# Patient Record
Sex: FEMALE | Race: WHITE | NOT HISPANIC OR LATINO | URBAN - METROPOLITAN AREA
[De-identification: names, ages, dates, MRNs, and addresses within clinical notes are randomized per-mention and may not be internally consistent; named-entity substitution may affect disease eponyms.]

---
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
## 2025-05-19 ENCOUNTER — OFFICE VISIT (OUTPATIENT)
Dept: DERMATOLOGY | Facility: IMAGING CENTER | Age: 20
End: 2025-05-19
Payer: COMMERCIAL

## 2025-05-19 DIAGNOSIS — L70.0 ACNE VULGARIS: ICD-10-CM

## 2025-05-19 LAB
POCT INT CON NEG: NEGATIVE
POCT INT CON POS: POSITIVE
POCT URINE PREGNANCY TEST: NEGATIVE

## 2025-05-19 PROCEDURE — 99204 OFFICE O/P NEW MOD 45 MIN: CPT | Performed by: STUDENT IN AN ORGANIZED HEALTH CARE EDUCATION/TRAINING PROGRAM

## 2025-05-19 PROCEDURE — 81025 URINE PREGNANCY TEST: CPT | Performed by: STUDENT IN AN ORGANIZED HEALTH CARE EDUCATION/TRAINING PROGRAM

## 2025-05-19 RX ORDER — LEVOTHYROXINE SODIUM 25 UG/1
25 TABLET ORAL
COMMUNITY

## 2025-05-19 NOTE — PROGRESS NOTES
[facility] DERMATOLOGY CLINIC NOTE    Chief Complaint   Patient presents with    [facility]    Acne        HPI:    Mary June is a 19 y.o. female here for evaluation of Acne. Interested in Accutane. Sister has been on Accutane. pt will be attending College in September in [employer] [facility]      Acne  Started: since was 14 years old. Has gotten worse in the last couple years  Active on: Face  Aggravated by: No, believes its hormonal   Treatment currently used: Tretinoin 0.025%  Prior treatments used: No  Face wash/moisturizer: Cerave Hydrating foaming cleanser, Cerave hyaluronic acid. Neutrogena sunscreen  Family history of scarring acne: Father and paternal aunts  Contraception: No. Was on it but made acne worse  Family h/o breast/uterine/ovarian cancers: No     No other symptomatic (itching, painful, burning) or changing lesions.       Review of Systems: No fevers, chill. Pertinent positives and negatives above.       Medications, Medical History, Surgical History, Family History & Allergies:  Reviewed in the chart, relevant history noted above.       PHYSICAL EXAM  Focused skin exam of face and neck    - scattered erythematous papules, cysts with depressed hyperpigmented scarring on the cheeks, forehead, chin      ASSESSMENT & PLAN        ASSESSMENT & PLAN  Acne vulgaris - severe, inflammatory, scaring    - educated about diagnosis, management options, and expectations of treatment  - given lack of response/clearance with prior treatment, I discussed options of continuing current regimen, versus initiation of isotretinoin to prevent further activity and scarring. After discussion of risks and benefits as below, patient opts to start isotretinoin.   - Introductory packet for isotretinoin treatment provided and reviewed.     ISOTRETINOIN COUNSELING:   I extensively discussed the potential adverse effects of treatment, including, but not limited to, birth defects, eye disturbances, bony abnormailities, lipid disturbances,  liver inflammation, reported associations with inflammatory bowel disease, depression, endocrine disturbances, hematologic abnormalities, and muscle inflammation. More common side effects of dry skin/eyes/mucosa, photosensitivity, sticky skin, hair loss, fragile nails, paronychia, muscle aches, fatigue, headache, and abdominal pain/nausea/diarrhea were also reviewed.    I additionally discussed the need for two forms of birth control, (or abstinence), and the need to confirm/initiate these two forms of birth control for at least one month prior to starting treatment. We discussed the need to comply with pregnancy testing before, during, and after treatment (monthy, continued until one month after completion of course), which coincides with her necessary monthly visits to the office throughout the duration of treatment.     Counseled patient to not share the medication, and not to donate blood during treatment, and through one month after completion of treatment.    - we discussed the need for baseline blood work, and follow-up blood work once treatment is initiated   - Baseline fasting lipid, hepatic panel ordered  - recommended to take medication with food with healthy fat, as well to avoid alcohol consumption  - patient aware to notify the office if any new/concerning symptoms  - lubricating eye drops, nasal spray, vaseline  - birth control method: Abstinence  - Pregnancy test negative today  - ipledge ID: [id number]    Return in about 30 days (around 6/18/2025) for to start accutane.        Nasreen Dorsey MD  [facility]

## 2025-05-21 ENCOUNTER — HOSPITAL ENCOUNTER (OUTPATIENT)
Dept: LAB | Facility: MEDICAL CENTER | Age: 20
End: 2025-05-21
Attending: STUDENT IN AN ORGANIZED HEALTH CARE EDUCATION/TRAINING PROGRAM
Payer: COMMERCIAL

## 2025-05-21 DIAGNOSIS — L70.0 ACNE VULGARIS: ICD-10-CM

## 2025-05-21 PROCEDURE — 80076 HEPATIC FUNCTION PANEL: CPT

## 2025-05-21 PROCEDURE — 80061 LIPID PANEL: CPT

## 2025-05-21 PROCEDURE — 36415 COLL VENOUS BLD VENIPUNCTURE: CPT

## 2025-05-22 LAB
ALBUMIN SERPL BCP-MCNC: 4.6 G/DL (ref 3.2–4.9)
ALP SERPL-CCNC: 100 U/L (ref 30–99)
ALT SERPL-CCNC: 11 U/L (ref 2–50)
AST SERPL-CCNC: 18 U/L (ref 12–45)
BILIRUB CONJ SERPL-MCNC: <0.2 MG/DL (ref 0.1–0.5)
BILIRUB INDIRECT SERPL-MCNC: ABNORMAL MG/DL (ref 0–1)
BILIRUB SERPL-MCNC: 0.3 MG/DL (ref 0.1–1.5)
CHOLEST SERPL-MCNC: 180 MG/DL (ref 100–199)
FASTING STATUS PATIENT QL REPORTED: NORMAL
HDLC SERPL-MCNC: 54 MG/DL
LDLC SERPL CALC-MCNC: 115 MG/DL
PROT SERPL-MCNC: 8.1 G/DL (ref 6–8.2)
TRIGL SERPL-MCNC: 56 MG/DL (ref 0–149)

## 2025-05-23 ENCOUNTER — RESULTS FOLLOW-UP (OUTPATIENT)
Dept: DERMATOLOGY | Facility: IMAGING CENTER | Age: 20
End: 2025-05-23

## 2025-06-24 ENCOUNTER — OFFICE VISIT (OUTPATIENT)
Dept: DERMATOLOGY | Facility: IMAGING CENTER | Age: 20
End: 2025-06-24
Payer: COMMERCIAL

## 2025-06-24 VITALS — BODY MASS INDEX: 28.06 KG/M2 | WEIGHT: 190 LBS

## 2025-06-24 DIAGNOSIS — Z86.19 H/O COLD SORES: ICD-10-CM

## 2025-06-24 DIAGNOSIS — Z79.899 ENCOUNTER FOR LONG-TERM CURRENT USE OF HIGH RISK MEDICATION: ICD-10-CM

## 2025-06-24 DIAGNOSIS — L70.0 ACNE VULGARIS: ICD-10-CM

## 2025-06-24 LAB
POCT INT CON NEG: NEGATIVE
POCT INT CON POS: POSITIVE
POCT URINE PREGNANCY TEST: NEGATIVE

## 2025-06-24 PROCEDURE — 81025 URINE PREGNANCY TEST: CPT | Performed by: STUDENT IN AN ORGANIZED HEALTH CARE EDUCATION/TRAINING PROGRAM

## 2025-06-24 PROCEDURE — 99214 OFFICE O/P EST MOD 30 MIN: CPT | Performed by: STUDENT IN AN ORGANIZED HEALTH CARE EDUCATION/TRAINING PROGRAM

## 2025-06-24 RX ORDER — ISOTRETINOIN 40 MG/1
40 CAPSULE ORAL DAILY
Qty: 30 CAPSULE | Refills: 0 | Status: SHIPPED | OUTPATIENT
Start: 2025-06-24

## 2025-06-24 RX ORDER — ACYCLOVIR 400 MG/1
400 TABLET ORAL 2 TIMES DAILY
Qty: 8 TABLET | Refills: 0 | Status: SHIPPED | OUTPATIENT
Start: 2025-06-24

## 2025-06-24 NOTE — PROGRESS NOTES
[facility] DERMATOLOGY CLINIC NOTE    Chief Complaint   Patient presents with    Follow-Up    Acne        HPI:    Mary June is a 19 y.o. female here for starting isotretinoin/accutane. Patient would also like refill for acyclovir for cold sore outbreaks. Get them more frequently in the summer time.  Has tried to log onto ipledge but had issues.           Initial Visit:  Acne  Started: since was 14 years old. Has gotten worse in the last couple years  Active on: Face  Aggravated by: No, believes its hormonal   Treatment currently used: Tretinoin 0.025%  Prior treatments used: No  Face wash/moisturizer: Cerave Hydrating foaming cleanser, Cerave hyaluronic acid. Neutrogena sunscreen  Family history of scarring acne: Father and paternal aunts  Contraception: No. Was on it but made acne worse  Family h/o breast/uterine/ovarian cancers: No     No other symptomatic (itching, painful, burning) or changing lesions.       Review of Systems: No fevers, chill. Pertinent positives and negatives above.       Medications, Medical History, Surgical History, Family History & Allergies:  Reviewed in the chart, relevant history noted above.       PHYSICAL EXAM  Focused skin exam of face and neck    - scattered erythematous papules, cysts with depressed hyperpigmented scarring on the cheeks, forehead, chin      ASSESSMENT & PLAN        ASSESSMENT & PLAN  Acne vulgaris - severe, inflammatory, scaring    - educated about diagnosis, management options, and expectations of treatment  - given lack of response/clearance with prior treatment, I discussed options of continuing current regimen, versus initiation of isotretinoin to prevent further activity and scarring. After discussion of risks and benefits as below, patient opts to start isotretinoin.   - Introductory packet for isotretinoin treatment provided and reviewed.     ISOTRETINOIN COUNSELING:   I extensively discussed the potential adverse effects of treatment, including, but not  limited to, birth defects, eye disturbances, bony abnormailities, lipid disturbances, liver inflammation, reported associations with inflammatory bowel disease, depression, endocrine disturbances, hematologic abnormalities, and muscle inflammation. More common side effects of dry skin/eyes/mucosa, photosensitivity, sticky skin, hair loss, fragile nails, paronychia, muscle aches, fatigue, headache, and abdominal pain/nausea/diarrhea were also reviewed.    I additionally discussed the need for two forms of birth control, (or abstinence), and the need to confirm/initiate these two forms of birth control for at least one month prior to starting treatment. We discussed the need to comply with pregnancy testing before, during, and after treatment (monthy, continued until one month after completion of course), which coincides with her necessary monthly visits to the office throughout the duration of treatment.     Counseled patient to not share the medication, and not to donate blood during treatment, and through one month after completion of treatment.    - we discussed the need for baseline blood work, and follow-up blood work once treatment is initiated   - Baseline fasting lipid, hepatic panel ordered  - recommended to take medication with food with healthy fat, as well to avoid alcohol consumption  - patient aware to notify the office if any new/concerning symptoms  - lubricating eye drops, nasal spray, vaseline  - birth control method: Abstinence  - Start isotretinoin 40mg once daily  - Pregnancy test negative today  - ipledge ID: [id number]  Pt weight 86.4kg  - Goal dose (150mg/kg): 44074 mg; cumulative dose today: 0mg    Patient can obtain their prescription from:  June 24, 2025 - June 30, 2025 (7 - Day Prescription window)  - instructed pt to contact ipledge for account access, reminded strict 7 day window to do comprehension quiz online and stop by pharmacy    # History of cold sores  - acyclovir  refilled    Return in about 4 weeks (around 7/22/2025) for Accutane.        Nasreen Dorsey MD  [facility]

## 2025-07-23 ENCOUNTER — OFFICE VISIT (OUTPATIENT)
Dept: DERMATOLOGY | Facility: IMAGING CENTER | Age: 20
End: 2025-07-23
Payer: COMMERCIAL

## 2025-07-23 DIAGNOSIS — Z79.899 ENCOUNTER FOR LONG-TERM CURRENT USE OF HIGH RISK MEDICATION: ICD-10-CM

## 2025-07-23 DIAGNOSIS — L70.0 ACNE VULGARIS: ICD-10-CM

## 2025-07-23 DIAGNOSIS — Z86.19 H/O COLD SORES: ICD-10-CM

## 2025-07-23 LAB
POCT INT CON NEG: NEGATIVE
POCT INT CON POS: POSITIVE
POCT URINE PREGNANCY TEST: NEGATIVE

## 2025-07-23 PROCEDURE — 81025 URINE PREGNANCY TEST: CPT | Performed by: STUDENT IN AN ORGANIZED HEALTH CARE EDUCATION/TRAINING PROGRAM

## 2025-07-23 PROCEDURE — 99214 OFFICE O/P EST MOD 30 MIN: CPT | Performed by: STUDENT IN AN ORGANIZED HEALTH CARE EDUCATION/TRAINING PROGRAM

## 2025-07-23 RX ORDER — ISOTRETINOIN 40 MG/1
40 CAPSULE, GELATIN COATED ORAL 2 TIMES DAILY
Qty: 60 CAPSULE | Refills: 0 | Status: SHIPPED | OUTPATIENT
Start: 2025-07-23

## 2025-07-23 RX ORDER — ACYCLOVIR 400 MG/1
400 TABLET ORAL 2 TIMES DAILY
Qty: 8 TABLET | Refills: 3 | Status: SHIPPED | OUTPATIENT
Start: 2025-07-23

## 2025-07-23 NOTE — PROGRESS NOTES
[facility] DERMATOLOGY CLINIC NOTE    Chief Complaint   Patient presents with    Follow-Up        HPI:    Mary June is a 19 y.o. female here for follow up acne, completed first month of isotretinoin 40mg daily. Tolerating well but does endorse dry lips and photosensitivity. Had acne flare this past month. Denies headaches, vision change, abdominal pain, MSK pain, changes in mood.         No other symptomatic (itching, painful, burning) or changing lesions.       Review of Systems: No fevers, chill. Pertinent positives and negatives above.       Medications, Medical History, Surgical History, Family History & Allergies:  Reviewed in the chart, relevant history noted above.       PHYSICAL EXAM  Focused skin exam of face and neck    - scattered erythematous papules, cysts with depressed hyperpigmented scarring on the cheeks, forehead, chin      ASSESSMENT & PLAN        ASSESSMENT & PLAN  Acne vulgaris - severe, inflammatory, scaring    - educated about diagnosis, management options, and expectations of treatment  - given lack of response/clearance with prior treatment, I discussed options of continuing current regimen, versus initiation of isotretinoin to prevent further activity and scarring. After discussion of risks and benefits as below, patient opts to start isotretinoin.   - Introductory packet for isotretinoin treatment provided and reviewed.     ISOTRETINOIN COUNSELING:   I extensively discussed the potential adverse effects of treatment, including, but not limited to, birth defects, eye disturbances, bony abnormailities, lipid disturbances, liver inflammation, reported associations with inflammatory bowel disease, depression, endocrine disturbances, hematologic abnormalities, and muscle inflammation. More common side effects of dry skin/eyes/mucosa, photosensitivity, sticky skin, hair loss, fragile nails, paronychia, muscle aches, fatigue, headache, and abdominal pain/nausea/diarrhea were also reviewed.    I  additionally discussed the need for two forms of birth control, (or abstinence), and the need to confirm/initiate these two forms of birth control for at least one month prior to starting treatment. We discussed the need to comply with pregnancy testing before, during, and after treatment (monthy, continued until one month after completion of course), which coincides with her necessary monthly visits to the office throughout the duration of treatment.     Counseled patient to not share the medication, and not to donate blood during treatment, and through one month after completion of treatment.    - Baseline TG, AST/ALT wnl  - recommended to take medication with food with healthy fat, as well to avoid alcohol consumption  - patient aware to notify the office if any new/concerning symptoms  - lubricating eye drops, nasal spray, vaseline  - birth control method: Abstinence  - Increase to isotretinoin 80mg once daily  - Pregnancy test negative today  - ipledge ID: [id number]  Pt weight 86.4kg  - Goal dose (150mg/kg): 55411 mg; cumulative dose today: 1200mg    Patient can obtain their prescription from:  July 23, 2025 - July 29, 2025 (7 - Day Prescription window)      # History of cold sores  - acyclovir refilled    Return in about 4 weeks (around 8/20/2025) for Accutane.        Nasreen Dorsey MD  [facility]

## 2025-08-13 ENCOUNTER — OFFICE VISIT (OUTPATIENT)
Dept: DERMATOLOGY | Facility: IMAGING CENTER | Age: 20
End: 2025-08-13
Payer: COMMERCIAL

## 2025-08-13 DIAGNOSIS — L70.0 ACNE VULGARIS: ICD-10-CM

## 2025-08-13 DIAGNOSIS — Z79.899 ENCOUNTER FOR LONG-TERM CURRENT USE OF HIGH RISK MEDICATION: ICD-10-CM

## 2025-08-13 DIAGNOSIS — L30.9 DERMATITIS: ICD-10-CM

## 2025-08-13 LAB
POCT INT CON NEG: NEGATIVE
POCT INT CON POS: POSITIVE
POCT URINE PREGNANCY TEST: NEGATIVE

## 2025-08-13 PROCEDURE — 99214 OFFICE O/P EST MOD 30 MIN: CPT | Performed by: STUDENT IN AN ORGANIZED HEALTH CARE EDUCATION/TRAINING PROGRAM

## 2025-08-13 PROCEDURE — 81025 URINE PREGNANCY TEST: CPT | Performed by: STUDENT IN AN ORGANIZED HEALTH CARE EDUCATION/TRAINING PROGRAM

## 2025-08-13 RX ORDER — TRIAMCINOLONE ACETONIDE 1 MG/G
OINTMENT TOPICAL
Qty: 30 G | Refills: 1 | Status: SHIPPED | OUTPATIENT
Start: 2025-08-13

## 2025-08-13 RX ORDER — ISOTRETINOIN 40 MG/1
40 CAPSULE, GELATIN COATED ORAL 2 TIMES DAILY
Qty: 60 CAPSULE | Refills: 0 | Status: SHIPPED | OUTPATIENT
Start: 2025-08-13 | End: 2025-08-13

## 2025-08-13 RX ORDER — ISOTRETINOIN 40 MG/1
40 CAPSULE ORAL 2 TIMES DAILY
Qty: 60 CAPSULE | Refills: 0 | Status: SHIPPED | OUTPATIENT
Start: 2025-08-13 | End: 2025-08-13

## 2025-08-13 RX ORDER — ISOTRETINOIN 40 MG/1
40 CAPSULE ORAL 2 TIMES DAILY
Qty: 60 CAPSULE | Refills: 0 | Status: SHIPPED | OUTPATIENT
Start: 2025-08-13

## 2025-08-13 RX ORDER — ISOTRETINOIN 40 MG/1
40 CAPSULE ORAL 2 TIMES DAILY
Qty: 60 CAPSULE | Refills: 0 | Status: CANCELLED | OUTPATIENT
Start: 2025-08-13